# Patient Record
Sex: MALE | Race: WHITE | NOT HISPANIC OR LATINO | URBAN - METROPOLITAN AREA
[De-identification: names, ages, dates, MRNs, and addresses within clinical notes are randomized per-mention and may not be internally consistent; named-entity substitution may affect disease eponyms.]

---

## 2024-01-04 DIAGNOSIS — N40.1 BENIGN PROSTATIC HYPERPLASIA WITH URINARY FREQUENCY: Primary | ICD-10-CM

## 2024-01-04 DIAGNOSIS — R35.0 BENIGN PROSTATIC HYPERPLASIA WITH URINARY FREQUENCY: Primary | ICD-10-CM

## 2024-01-05 RX ORDER — TADALAFIL 5 MG/1
5 TABLET ORAL DAILY
Qty: 90 TABLET | Refills: 0 | Status: SHIPPED | OUTPATIENT
Start: 2024-01-05 | End: 2024-04-23 | Stop reason: SDUPTHER

## 2024-02-22 NOTE — PROGRESS NOTES
"FUV    Last seen - 6/1/23     HISTORY OF PRESENT ILLNESS:   Eugenio Glass is a 78 y.o. male who is being seen today for 6 month FUV    -Jareth score of 3+3=6 prostate cancer in 1 of 3 cores with a maximum of 15% of the specimen, HGPIN, and ASAP   -elevated PSA  -BPH, on Cialis 5 mg  - ED    PSA trend:  -5.83 on 1/11/23   - 7.37 on 2/6/22     PAST MEDICAL HISTORY:  Past Medical History:   Diagnosis Date    Personal history of other diseases of the circulatory system     History of atrial fibrillation    Personal history of other diseases of the musculoskeletal system and connective tissue     History of arthritis       PAST SURGICAL HISTORY:  Past Surgical History:   Procedure Laterality Date    OTHER SURGICAL HISTORY  09/26/2019    Ablation    OTHER SURGICAL HISTORY  09/26/2019    Hip replacement        ALLERGIES:   Not on File     MEDICATIONS:   Current Outpatient Medications   Medication Instructions    tadalafil (CIALIS) 5 mg, oral, Daily        PHYSICAL EXAM:  There were no vitals taken for this visit.  Constitutional: Patient appears well-developed and well-nourished. No distress.    Pulmonary/Chest: Effort normal. No respiratory distress.   Abdominal: Soft, ND NT  : WNL  Musculoskeletal: Normal range of motion.    Neurological: Alert and oriented to person, place, and time.  Psychiatric: Normal mood and affect. Behavior is normal. Thought content normal.      Labs:  No results found for: \"TESTOSTERONE\"  Lab Results   Component Value Date    PSA 5.83 (H) 01/11/2023     No components found for: \"CBC\"  Lab Results   Component Value Date    CREATININE 0.85 06/06/2022     No components found for: \"TESTOTMS\"  No results found for: \"TESTF\"    Imaging:    Discussion: No recent PSA drawn. He was seen by Dr Rees at [facility]. Denies any new urinary complaints. NTF 2-3x. Offered to try additional medications, pt declines at this time. Patient is currently in [place]. Advised to get PSA drawn there and will send us " results. Will follow up in person in May with another PSA prior. If PSA stable, will keep following up with Dr Rees.      Assessment:    No diagnosis found.    Eugenio Glass is a 78 y.o. male here for FUV     Plan:   1) PSA now and in May  2- Follow up in-person in May with PSA prior  All questions and concerns were addressed. Patient verbalizes understanding and has no other questions at this time.     Scribe Attestation  By signing my name below, I, Jeramie Kurtz   attest that this documentation has been prepared under the direction and in the presence of Anton Bangura MD.

## 2024-02-23 ENCOUNTER — TELEMEDICINE (OUTPATIENT)
Dept: UROLOGY | Facility: HOSPITAL | Age: 79
End: 2024-02-23
Payer: MEDICARE

## 2024-02-23 DIAGNOSIS — C61 MALIGNANT NEOPLASM OF PROSTATE (MULTI): ICD-10-CM

## 2024-02-23 PROCEDURE — 99213 OFFICE O/P EST LOW 20 MIN: CPT | Performed by: UROLOGY

## 2024-04-22 DIAGNOSIS — N40.1 BENIGN PROSTATIC HYPERPLASIA WITH URINARY FREQUENCY: ICD-10-CM

## 2024-04-22 DIAGNOSIS — R35.0 BENIGN PROSTATIC HYPERPLASIA WITH URINARY FREQUENCY: ICD-10-CM

## 2024-04-23 RX ORDER — TADALAFIL 5 MG/1
5 TABLET ORAL DAILY
Qty: 90 TABLET | Refills: 0 | Status: SHIPPED | OUTPATIENT
Start: 2024-04-23

## 2024-05-14 ENCOUNTER — LAB (OUTPATIENT)
Dept: LAB | Facility: LAB | Age: 79
End: 2024-05-14
Payer: MEDICARE

## 2024-05-14 DIAGNOSIS — C61 MALIGNANT NEOPLASM OF PROSTATE (MULTI): ICD-10-CM

## 2024-05-14 LAB — PSA SERPL-MCNC: 8.48 NG/ML

## 2024-05-14 PROCEDURE — 36415 COLL VENOUS BLD VENIPUNCTURE: CPT

## 2024-05-14 PROCEDURE — 84153 ASSAY OF PSA TOTAL: CPT

## 2024-05-30 NOTE — PROGRESS NOTES
"FUV    Last seen - 2/23/24     HISTORY OF PRESENT ILLNESS:   Eugenio Glass is a 78 y.o. male who is being seen today for 6 month FUV    -[place] score of 3+3=6 prostate cancer in 1 of 3 cores with a maximum of 15% of the specimen, HGPIN, and ASAP   -elevated PSA  -BPH, on Cialis 5 mg  - ED    PSA trend:  -8.48 on 5/14/24  -5.83 on 1/11/23   - 7.37 on 2/6/22     PAST MEDICAL HISTORY:  Past Medical History:   Diagnosis Date    Personal history of other diseases of the circulatory system     History of atrial fibrillation    Personal history of other diseases of the musculoskeletal system and connective tissue     History of arthritis       PAST SURGICAL HISTORY:  Past Surgical History:   Procedure Laterality Date    OTHER SURGICAL HISTORY  09/26/2019    Ablation    OTHER SURGICAL HISTORY  09/26/2019    Hip replacement        ALLERGIES:   Not on File     MEDICATIONS:   Current Outpatient Medications   Medication Instructions    tadalafil (CIALIS) 5 mg, oral, Daily        PHYSICAL EXAM:  There were no vitals taken for this visit.  Constitutional: Patient appears well-developed and well-nourished. No distress.    Pulmonary/Chest: Effort normal. No respiratory distress.   Abdominal: Soft, ND NT  : WNL  Musculoskeletal: Normal range of motion.    Neurological: Alert and oriented to person, place, and time.  Psychiatric: Normal mood and affect. Behavior is normal. Thought content normal.      Labs:  No results found for: \"TESTOSTERONE\"  Lab Results   Component Value Date    PSA 8.48 (H) 05/14/2024     No components found for: \"CBC\"  Lab Results   Component Value Date    CREATININE 0.85 06/06/2022     No components found for: \"TESTOTMS\"  No results found for: \"TESTF\"    Imaging:    Discussion: No urinary complaints at this time. Denies hematuria, dysuria, nocturia, flank pain, and bothersome frequency or urgency. Denies pushing or straining to void and states he feels he empties his bladder when voiding.Erections not a " priority.   He was [facility] by [facility]. Pt is not interested in treatment options for prostate cancer at this time. Spoke at length with patient and recommend considering treatment if PSA rises above 10. Will follow up in 6 months with a PSA prior.     Assessment:    No diagnosis found.    Eugenio Glass is a 78 y.o. male here for FUV     Plan:   1)Follow up in 6 months with PSA prior  All questions and concerns were addressed. Patient verbalizes understanding and has no other questions at this time.     Scribe Attestation  By signing my name below, ILaisha Scribe   attest that this documentation has been prepared under the direction and in the presence of Anton Bangura MD.

## 2024-05-31 ENCOUNTER — OFFICE VISIT (OUTPATIENT)
Dept: UROLOGY | Facility: HOSPITAL | Age: 79
End: 2024-05-31
Payer: MEDICARE

## 2024-05-31 DIAGNOSIS — C61 MALIGNANT NEOPLASM OF PROSTATE (MULTI): Primary | ICD-10-CM

## 2024-05-31 PROCEDURE — G2211 COMPLEX E/M VISIT ADD ON: HCPCS | Performed by: UROLOGY

## 2024-05-31 PROCEDURE — 99214 OFFICE O/P EST MOD 30 MIN: CPT | Performed by: UROLOGY

## 2024-07-18 DIAGNOSIS — N40.1 BENIGN PROSTATIC HYPERPLASIA WITH URINARY FREQUENCY: ICD-10-CM

## 2024-07-18 DIAGNOSIS — R35.0 BENIGN PROSTATIC HYPERPLASIA WITH URINARY FREQUENCY: ICD-10-CM

## 2024-07-18 RX ORDER — TADALAFIL 5 MG/1
5 TABLET ORAL DAILY
Qty: 90 TABLET | Refills: 0 | Status: SHIPPED | OUTPATIENT
Start: 2024-07-18

## 2024-10-26 DIAGNOSIS — N40.1 BENIGN PROSTATIC HYPERPLASIA WITH URINARY FREQUENCY: ICD-10-CM

## 2024-10-26 DIAGNOSIS — R35.0 BENIGN PROSTATIC HYPERPLASIA WITH URINARY FREQUENCY: ICD-10-CM

## 2024-11-01 DIAGNOSIS — N40.1 BENIGN PROSTATIC HYPERPLASIA WITH URINARY FREQUENCY: ICD-10-CM

## 2024-11-01 DIAGNOSIS — R35.0 BENIGN PROSTATIC HYPERPLASIA WITH URINARY FREQUENCY: ICD-10-CM

## 2024-11-01 RX ORDER — TADALAFIL 5 MG/1
5 TABLET ORAL DAILY
Qty: 90 TABLET | Refills: 0 | Status: SHIPPED | OUTPATIENT
Start: 2024-11-01

## 2024-11-04 RX ORDER — TADALAFIL 5 MG/1
5 TABLET ORAL DAILY
Qty: 90 TABLET | Refills: 3 | Status: SHIPPED | OUTPATIENT
Start: 2024-11-04

## 2024-11-08 ENCOUNTER — LAB (OUTPATIENT)
Dept: LAB | Facility: LAB | Age: 79
End: 2024-11-08
Payer: MEDICARE

## 2024-11-08 DIAGNOSIS — C61 MALIGNANT NEOPLASM OF PROSTATE (MULTI): ICD-10-CM

## 2024-11-08 LAB — PSA SERPL-MCNC: 9.87 NG/ML

## 2024-11-08 PROCEDURE — 84153 ASSAY OF PSA TOTAL: CPT

## 2024-11-08 PROCEDURE — 36415 COLL VENOUS BLD VENIPUNCTURE: CPT

## 2024-12-05 NOTE — PROGRESS NOTES
"Virtual or Telephone Consent    An interactive audio and video telecommunication system which permits real time communications between the patient (at the originating site) and provider (at the distant site) was utilized to provide this telehealth service.   Verbal consent was requested and obtained from Eugenio Glass on this date, 12/06/24 for a telehealth visit.      Last seen - 5/31/24     HISTORY OF PRESENT ILLNESS:   Eugenio Glass is a 78 y.o. male who is being seen today for 6 month FUV    -Jareth score of 3+3=6 prostate cancer in 1 of 3 cores with a maximum of 15% of the specimen, HGPIN, and ASAP   -elevated PSA  -BPH, on Cialis 5 mg  - ED    PSA trend:  -9.87 on 11/8/24  -8.48 on 5/14/24  -9.83 on 09/08/23  -5.83 on 1/11/23   - 7.37 on 2/6/22     PAST MEDICAL HISTORY:  Past Medical History:   Diagnosis Date    Personal history of other diseases of the circulatory system     History of atrial fibrillation    Personal history of other diseases of the musculoskeletal system and connective tissue     History of arthritis       PAST SURGICAL HISTORY:  Past Surgical History:   Procedure Laterality Date    OTHER SURGICAL HISTORY  09/26/2019    Ablation    OTHER SURGICAL HISTORY  09/26/2019    Hip replacement        ALLERGIES:   Not on File     MEDICATIONS:   Current Outpatient Medications   Medication Instructions    tadalafil (CIALIS) 5 mg, oral, Daily        PHYSICAL EXAM:  There were no vitals taken for this visit.  Constitutional: Patient appears well-developed and well-nourished. No distress.    Pulmonary/Chest: Effort normal. No respiratory distress.   Abdominal: Soft, ND NT  : WNL  Musculoskeletal: Normal range of motion.    Neurological: Alert and oriented to person, place, and time.  Psychiatric: Normal mood and affect. Behavior is normal. Thought content normal.      Labs:  No results found for: \"TESTOSTERONE\"  Lab Results   Component Value Date    PSA 9.87 (H) 11/08/2024     No components found for: " "\"CBC\"  Lab Results   Component Value Date    CREATININE 0.85 06/06/2022     No components found for: \"TESTOTMS\"  No results found for: \"TESTF\"    Imaging:    Discussion: No urinary complaints at this time. Denies hematuria, dysuria, nocturia, flank pain, and bothersome frequency or urgency. Denies pushing or straining to void and states he feels he empties his bladder when voiding. Erections not a priority.   He was seen by Dr Rees at [facility]. Pt is not interested in treatment options for prostate cancer at this time. His PSA has continued to rise at 9.87. Spoke at length with patient and recommend considering treatment if PSA rises above 10. He would like to hold off on radiation therapy until he comes back from [place]. Will follow up in 6 months to discuss radiation therapy with a PSA prior.     Assessment:    No diagnosis found.  Prostate Cancer    Eugenio Glass is a 78 y.o. male here for FUV     Plan:   Follow up in 6 months with PSA prior  All questions and concerns were addressed. Patient verbalizes understanding and has no other questions at this time.     Scribe Attestation  By signing my name below, ILaisha Scribe   attest that this documentation has been prepared under the direction and in the presence of Anton Bangura MD.    Scribe Attestation  By signing my name below, IKristian Scribe   attest that this documentation has been prepared under the direction and in the presence of Anton Bangura MD.   "

## 2024-12-06 ENCOUNTER — TELEMEDICINE (OUTPATIENT)
Dept: UROLOGY | Facility: HOSPITAL | Age: 79
End: 2024-12-06
Payer: MEDICARE

## 2024-12-06 DIAGNOSIS — C61 MALIGNANT NEOPLASM OF PROSTATE (MULTI): Primary | ICD-10-CM

## 2024-12-06 PROCEDURE — G2211 COMPLEX E/M VISIT ADD ON: HCPCS | Performed by: UROLOGY

## 2024-12-06 PROCEDURE — 99214 OFFICE O/P EST MOD 30 MIN: CPT | Performed by: UROLOGY

## 2025-06-20 ENCOUNTER — APPOINTMENT (OUTPATIENT)
Dept: UROLOGY | Facility: HOSPITAL | Age: 80
End: 2025-06-20
Payer: MEDICARE

## 2025-07-10 DIAGNOSIS — C61 MALIGNANT NEOPLASM OF PROSTATE (MULTI): ICD-10-CM

## 2025-08-12 LAB — PSA SERPL-MCNC: 9.03 NG/ML

## 2025-08-21 ENCOUNTER — TELEMEDICINE (OUTPATIENT)
Dept: UROLOGY | Facility: HOSPITAL | Age: 80
End: 2025-08-21
Payer: MEDICARE

## 2025-08-21 DIAGNOSIS — C61 MALIGNANT NEOPLASM OF PROSTATE (MULTI): ICD-10-CM

## 2025-08-21 PROCEDURE — 99214 OFFICE O/P EST MOD 30 MIN: CPT | Performed by: UROLOGY

## 2025-08-21 PROCEDURE — G2211 COMPLEX E/M VISIT ADD ON: HCPCS | Performed by: UROLOGY

## 2025-09-11 ENCOUNTER — APPOINTMENT (OUTPATIENT)
Dept: UROLOGY | Facility: HOSPITAL | Age: 80
End: 2025-09-11
Payer: MEDICARE